# Patient Record
Sex: FEMALE | Race: WHITE | NOT HISPANIC OR LATINO | URBAN - METROPOLITAN AREA
[De-identification: names, ages, dates, MRNs, and addresses within clinical notes are randomized per-mention and may not be internally consistent; named-entity substitution may affect disease eponyms.]

---

## 2021-09-22 ENCOUNTER — APPOINTMENT (RX ONLY)
Dept: URBAN - METROPOLITAN AREA CLINIC 166 | Facility: CLINIC | Age: 41
Setting detail: DERMATOLOGY
End: 2021-09-22

## 2021-09-22 DIAGNOSIS — L44.8 OTHER SPECIFIED PAPULOSQUAMOUS DISORDERS: ICD-10-CM

## 2021-09-22 PROBLEM — D48.5 NEOPLASM OF UNCERTAIN BEHAVIOR OF SKIN: Status: ACTIVE | Noted: 2021-09-22

## 2021-09-22 PROCEDURE — ? BIOPSY BY SHAVE METHOD

## 2021-09-22 PROCEDURE — 11102 TANGNTL BX SKIN SINGLE LES: CPT

## 2021-09-22 ASSESSMENT — LOCATION DETAILED DESCRIPTION DERM: LOCATION DETAILED: LEFT MEDIAL UPPER BACK

## 2021-09-22 ASSESSMENT — LOCATION ZONE DERM: LOCATION ZONE: TRUNK

## 2021-09-22 ASSESSMENT — LOCATION SIMPLE DESCRIPTION DERM: LOCATION SIMPLE: LEFT UPPER BACK

## 2021-09-22 NOTE — HPI: SKIN LESION
What Type Of Note Output Would You Prefer (Optional)?: Standard Output
Has Your Skin Lesion Been Treated?: not been treated
Is This A New Presentation, Or A Follow-Up?: Skin Lesion
Which Family Member (Optional)?: Paternal Uncle

## 2021-09-30 ENCOUNTER — APPOINTMENT (RX ONLY)
Dept: URBAN - METROPOLITAN AREA CLINIC 166 | Facility: CLINIC | Age: 41
Setting detail: DERMATOLOGY
End: 2021-09-30

## 2021-09-30 PROBLEM — C44.519 BASAL CELL CARCINOMA OF SKIN OF OTHER PART OF TRUNK: Status: ACTIVE | Noted: 2021-09-30

## 2021-09-30 PROCEDURE — 17261 DSTRJ MAL LES T/A/L .6-1.0CM: CPT

## 2021-09-30 PROCEDURE — ? CURETTAGE AND DESTRUCTION

## 2023-12-12 ENCOUNTER — APPOINTMENT (RX ONLY)
Dept: URBAN - METROPOLITAN AREA CLINIC 166 | Facility: CLINIC | Age: 43
Setting detail: DERMATOLOGY
End: 2023-12-12

## 2023-12-12 DIAGNOSIS — L57.8 OTHER SKIN CHANGES DUE TO CHRONIC EXPOSURE TO NONIONIZING RADIATION: ICD-10-CM

## 2023-12-12 DIAGNOSIS — L81.4 OTHER MELANIN HYPERPIGMENTATION: ICD-10-CM

## 2023-12-12 DIAGNOSIS — B35.3 TINEA PEDIS: ICD-10-CM

## 2023-12-12 DIAGNOSIS — Z85.828 PERSONAL HISTORY OF OTHER MALIGNANT NEOPLASM OF SKIN: ICD-10-CM

## 2023-12-12 DIAGNOSIS — D22 MELANOCYTIC NEVI: ICD-10-CM

## 2023-12-12 DIAGNOSIS — D18.0 HEMANGIOMA: ICD-10-CM

## 2023-12-12 DIAGNOSIS — L82.1 OTHER SEBORRHEIC KERATOSIS: ICD-10-CM

## 2023-12-12 DIAGNOSIS — Z71.89 OTHER SPECIFIED COUNSELING: ICD-10-CM

## 2023-12-12 PROBLEM — D18.01 HEMANGIOMA OF SKIN AND SUBCUTANEOUS TISSUE: Status: ACTIVE | Noted: 2023-12-12

## 2023-12-12 PROBLEM — D22.5 MELANOCYTIC NEVI OF TRUNK: Status: ACTIVE | Noted: 2023-12-12

## 2023-12-12 PROCEDURE — ? PRESCRIPTION

## 2023-12-12 PROCEDURE — ? TREATMENT REGIMEN

## 2023-12-12 PROCEDURE — ? COUNSELING

## 2023-12-12 PROCEDURE — 99214 OFFICE O/P EST MOD 30 MIN: CPT

## 2023-12-12 PROCEDURE — ? DIAGNOSIS COMMENT

## 2023-12-12 RX ORDER — TERBINAFINE HYDROCHLORIDE 1 G/100G
CREAM TOPICAL
Qty: 30 | Refills: 0 | Status: ERX | COMMUNITY
Start: 2023-12-12

## 2023-12-12 RX ADMIN — TERBINAFINE HYDROCHLORIDE: 1 CREAM TOPICAL at 00:00

## 2023-12-12 ASSESSMENT — LOCATION DETAILED DESCRIPTION DERM
LOCATION DETAILED: RIGHT MEDIAL UPPER BACK
LOCATION DETAILED: SUPERIOR THORACIC SPINE
LOCATION DETAILED: RIGHT SUPERIOR MEDIAL UPPER BACK
LOCATION DETAILED: INFERIOR THORACIC SPINE
LOCATION DETAILED: LEFT MEDIAL UPPER BACK
LOCATION DETAILED: LEFT SUPERIOR UPPER BACK

## 2023-12-12 ASSESSMENT — LOCATION SIMPLE DESCRIPTION DERM
LOCATION SIMPLE: UPPER BACK
LOCATION SIMPLE: RIGHT UPPER BACK
LOCATION SIMPLE: LEFT UPPER BACK

## 2023-12-12 ASSESSMENT — LOCATION ZONE DERM: LOCATION ZONE: TRUNK

## 2023-12-12 NOTE — PROCEDURE: TREATMENT REGIMEN
Detail Level: Zone
Plan: If rash does not resolve patient to follow up in one month.
Initiate Treatment: terbinafine HCl 1 % topical cream \\nApply to affected areas on the foot twice daily for two weeks

## 2024-04-16 ENCOUNTER — APPOINTMENT (RX ONLY)
Dept: URBAN - METROPOLITAN AREA CLINIC 166 | Facility: CLINIC | Age: 44
Setting detail: DERMATOLOGY
End: 2024-04-16

## 2024-04-16 DIAGNOSIS — L82.1 OTHER SEBORRHEIC KERATOSIS: ICD-10-CM

## 2024-04-16 PROCEDURE — ? COUNSELING

## 2024-04-16 PROCEDURE — ? DIAGNOSIS COMMENT

## 2024-04-16 PROCEDURE — ? EDUCATIONAL RESOURCES PROVIDED

## 2024-04-16 PROCEDURE — 99212 OFFICE O/P EST SF 10 MIN: CPT

## 2024-04-16 ASSESSMENT — LOCATION ZONE DERM: LOCATION ZONE: TRUNK

## 2024-04-16 ASSESSMENT — LOCATION SIMPLE DESCRIPTION DERM: LOCATION SIMPLE: CHEST

## 2024-04-16 ASSESSMENT — LOCATION DETAILED DESCRIPTION DERM: LOCATION DETAILED: MIDDLE STERNUM

## 2024-04-16 NOTE — PROCEDURE: DIAGNOSIS COMMENT
Render Risk Assessment In Note?: no
Detail Level: Simple
Comment: Discuses that the lesion is the remnants of a resolving inflamed seborrheic keratosis.